# Patient Record
Sex: FEMALE | Race: WHITE
[De-identification: names, ages, dates, MRNs, and addresses within clinical notes are randomized per-mention and may not be internally consistent; named-entity substitution may affect disease eponyms.]

---

## 2021-02-01 ENCOUNTER — HOSPITAL ENCOUNTER (INPATIENT)
Dept: HOSPITAL 54 - GPS | Age: 86
LOS: 4 days | Discharge: HOME | DRG: 881 | End: 2021-02-05
Attending: INTERNAL MEDICINE | Admitting: PSYCHIATRY & NEUROLOGY
Payer: MEDICARE

## 2021-02-01 VITALS — WEIGHT: 156 LBS | BODY MASS INDEX: 25.07 KG/M2 | HEIGHT: 66 IN

## 2021-02-01 DIAGNOSIS — R45.851: ICD-10-CM
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
## 2021-02-01 DIAGNOSIS — I83.90: ICD-10-CM

## 2021-02-01 DIAGNOSIS — F32.9: Primary | ICD-10-CM

## 2021-02-01 DIAGNOSIS — Z91.5: ICD-10-CM

## 2021-02-01 DIAGNOSIS — I48.91: ICD-10-CM

## 2021-02-01 DIAGNOSIS — I10: ICD-10-CM

## 2021-02-01 DIAGNOSIS — Z79.01: ICD-10-CM

## 2021-02-01 DIAGNOSIS — F41.9: ICD-10-CM

## 2021-02-01 DIAGNOSIS — F10.10: ICD-10-CM

## 2021-02-01 PROCEDURE — G0480 DRUG TEST DEF 1-7 CLASSES: HCPCS

## 2021-02-01 NOTE — NUR
GPS RN ADMISSION NOTES:



ADMITTED AN 87YO  FEMALE ON 5150 HOLD FOR DANGER TO SELF. PATIENT CAME FROM 
[facility]. PER HOLD THE PATIENT WAS INTOXICATED, REPORTED BEING DEPRESSED 
AND IS STRUGGLING WITH DEPRESSION. PATIENT WILL BE UNDER THE CARE OF DR. HELM AND DR. YUNG RESPECTIVELY. 



UPON FACE TO FACE ASSESSMENT, PATIENT PRESENTS AS ALERT AND ORIENTED X3-4, SHE KNOWS THE 
REASON WHY SHE IS BEING ADMITTED TO THE LOCKED UNIT. SHE DOES REMEMBER CALLING 911 FOR HELP 
BUT CANNOT RECALL WHAT HAS TRANSPIRED AFTER THAT. PATIENT IS WARM PLEASANT BUT ADMITS TO 
BEING REALLY DEPRESSED AND THAT SHE HAS HAD THOUGHTS OF HURTING HERSELF. DURING ADMISSION 
PATIENT DENIES ANY PLANS OR THOUGHTS OF SUICIDE HOWEVER PATIENT IS WILLING AND ABLE TO 
CONTRACT FOR SAFETY. SHE IS AMBULATORY WITH STANDBY ASSIST, ABLE TO SIGN ADMISSION PAPERS. 
BELONGINGS AND CONTRABAND CHECKED.Q15 MIN CHECKS STARTED. CARE PLAN PATIENT SPECIFIC 
INITIATED. SKIN AND BODY ASSESSMENT DONE. NO SKIN ISSUES NOTED THIS TIME. WILL MONITOR 
PATIENT FOR MOOD, SAFETY AND BEHAVIOR.

## 2021-02-01 NOTE — NUR
Substance Abuse Intervention: ARLENE conducted a substance abuse intervention with the pt due to 
her alcohol abuse (level was at 221).

-------------------------------------------------------------------------------

Addendum: 02/05/21 at 1245 by ARLENE SORIANO

-------------------------------------------------------------------------------

ERROR: ARLENE forgot to input note earlier and accidentally placed it under the wrong date. Note 
should be dated for 2/2/21 at 3:15PM.

## 2021-02-02 VITALS — SYSTOLIC BLOOD PRESSURE: 145 MMHG | DIASTOLIC BLOOD PRESSURE: 77 MMHG

## 2021-02-02 VITALS — DIASTOLIC BLOOD PRESSURE: 88 MMHG | SYSTOLIC BLOOD PRESSURE: 148 MMHG

## 2021-02-02 VITALS — SYSTOLIC BLOOD PRESSURE: 143 MMHG | DIASTOLIC BLOOD PRESSURE: 89 MMHG

## 2021-02-02 LAB
ALBUMIN SERPL BCP-MCNC: 3.4 G/DL (ref 3.4–5)
ALP SERPL-CCNC: 70 U/L (ref 46–116)
ALT SERPL W P-5'-P-CCNC: 18 U/L (ref 12–78)
AST SERPL W P-5'-P-CCNC: 16 U/L (ref 15–37)
BILIRUB SERPL-MCNC: 0.8 MG/DL (ref 0.2–1)
BUN SERPL-MCNC: 12 MG/DL (ref 7–18)
CALCIUM SERPL-MCNC: 8.8 MG/DL (ref 8.5–10.1)
CHLORIDE SERPL-SCNC: 104 MMOL/L (ref 98–107)
CHOLEST SERPL-MCNC: 146 MG/DL (ref ?–200)
CO2 SERPL-SCNC: 28 MMOL/L (ref 21–32)
CREAT SERPL-MCNC: 1 MG/DL (ref 0.6–1.3)
GLUCOSE SERPL-MCNC: 104 MG/DL (ref 74–106)
HDLC SERPL-MCNC: 39 MG/DL (ref 40–60)
LDLC SERPL DIRECT ASSAY-MCNC: 99 MG/DL (ref 0–99)
POTASSIUM SERPL-SCNC: 3.6 MMOL/L (ref 3.5–5.1)
PROT SERPL-MCNC: 6.7 G/DL (ref 6.4–8.2)
SODIUM SERPL-SCNC: 140 MMOL/L (ref 136–145)
TRIGL SERPL-MCNC: 72 MG/DL (ref 30–150)

## 2021-02-02 RX ADMIN — TRAZODONE HYDROCHLORIDE SCH MG: 50 TABLET ORAL at 21:06

## 2021-02-02 NOTE — NUR
Initial Discharge Plan: Pt currently resides at her home alone located at [address] [place] [id number]; [phone]. Per pt, she would like to return to her home. ARLENE will 
work with the pt and the MD regarding appropriate discharge planning. SW will form a safe 
and proper discharge.

## 2021-02-02 NOTE — NUR
RN NOTE- PT ALERT INTERACTIVE ORIENTED TO PERSON PLACE PURPOSE, DEPRESSED  CALM PO INTAKE 
GOOD, CHECKED ETOH LEVEL RESULTS <3 . NO SX OF WITHDRAWAL NOTED, VS STABLE DENIES SI HI AH 
VH

## 2021-02-02 NOTE — NUR
RN NOTE- PT QUESTIONED ABOUT INFLUENZA AND PNEUMONIA VACCINE ADMINISTRATION. PT STATES "I 
NEVER RECEIVE FLU SHOTS. THEY DON'T WORK." PT HAD PNEUMONIA VACCINE IN LAST TWO YEARS. 
REFUSES BOTH AT THIS TIME.

## 2021-02-02 NOTE — NUR
Family Contact: SW called the pts grandson, Tariq ([phone]), and left a voicemail 
stating that she would like to discuss the pts discharge plan.

## 2021-02-03 VITALS — DIASTOLIC BLOOD PRESSURE: 81 MMHG | SYSTOLIC BLOOD PRESSURE: 131 MMHG

## 2021-02-03 VITALS — SYSTOLIC BLOOD PRESSURE: 120 MMHG | DIASTOLIC BLOOD PRESSURE: 68 MMHG

## 2021-02-03 VITALS — SYSTOLIC BLOOD PRESSURE: 153 MMHG | DIASTOLIC BLOOD PRESSURE: 84 MMHG

## 2021-02-03 LAB
ALBUMIN SERPL BCP-MCNC: 3.3 G/DL (ref 3.4–5)
ALP SERPL-CCNC: 68 U/L (ref 46–116)
ALT SERPL W P-5'-P-CCNC: 20 U/L (ref 12–78)
AST SERPL W P-5'-P-CCNC: 16 U/L (ref 15–37)
BASOPHILS # BLD AUTO: 0 /CMM (ref 0–0.2)
BASOPHILS NFR BLD AUTO: 0.8 % (ref 0–2)
BILIRUB SERPL-MCNC: 0.8 MG/DL (ref 0.2–1)
BILIRUB UR QL STRIP: NEGATIVE
BUN SERPL-MCNC: 11 MG/DL (ref 7–18)
CALCIUM SERPL-MCNC: 9 MG/DL (ref 8.5–10.1)
CHLORIDE SERPL-SCNC: 105 MMOL/L (ref 98–107)
CHOLEST SERPL-MCNC: 156 MG/DL (ref ?–200)
CO2 SERPL-SCNC: 28 MMOL/L (ref 21–32)
COLOR UR: YELLOW
CREAT SERPL-MCNC: 0.8 MG/DL (ref 0.6–1.3)
DEPRECATED SQUAMOUS URNS QL MICRO: (no result) /HPF
EOSINOPHIL NFR BLD AUTO: 4.2 % (ref 0–6)
GLUCOSE SERPL-MCNC: 103 MG/DL (ref 74–106)
GLUCOSE UR STRIP-MCNC: NEGATIVE MG/DL
HCT VFR BLD AUTO: 41 % (ref 33–45)
HDLC SERPL-MCNC: 37 MG/DL (ref 40–60)
HGB BLD-MCNC: 14 G/DL (ref 11.5–14.8)
LDLC SERPL DIRECT ASSAY-MCNC: 107 MG/DL (ref 0–99)
LEUKOCYTE ESTERASE UR QL STRIP: (no result)
LYMPHOCYTES NFR BLD AUTO: 2 /CMM (ref 0.8–4.8)
LYMPHOCYTES NFR BLD AUTO: 32.3 % (ref 20–44)
MAGNESIUM SERPL-MCNC: 2.1 MG/DL (ref 1.8–2.4)
MCHC RBC AUTO-ENTMCNC: 34 G/DL (ref 31–36)
MCV RBC AUTO: 95 FL (ref 82–100)
MONOCYTES NFR BLD AUTO: 0.8 /CMM (ref 0.1–1.3)
MONOCYTES NFR BLD AUTO: 12.7 % (ref 2–12)
NEUTROPHILS # BLD AUTO: 3.1 /CMM (ref 1.8–8.9)
NEUTROPHILS NFR BLD AUTO: 50 % (ref 43–81)
NITRITE UR QL STRIP: NEGATIVE
PH UR STRIP: 6 [PH] (ref 5–8)
PHOSPHATE SERPL-MCNC: 3.9 MG/DL (ref 2.5–4.9)
PLATELET # BLD AUTO: 167 /CMM (ref 150–450)
POTASSIUM SERPL-SCNC: 4 MMOL/L (ref 3.5–5.1)
PROT SERPL-MCNC: 6.7 G/DL (ref 6.4–8.2)
PROT UR QL STRIP: NEGATIVE MG/DL
RBC # BLD AUTO: 4.31 MIL/UL (ref 4–5.2)
RBC #/AREA URNS HPF: (no result) /HPF (ref 0–2)
SODIUM SERPL-SCNC: 139 MMOL/L (ref 136–145)
TRIGL SERPL-MCNC: 77 MG/DL (ref 30–150)
TSH SERPL DL<=0.005 MIU/L-ACNC: 2.33 UIU/ML (ref 0.36–3.74)
UROBILINOGEN UR STRIP-MCNC: 0.2 EU/DL
WBC #/AREA URNS HPF: (no result) /HPF
WBC #/AREA URNS HPF: (no result) /HPF (ref 0–3)
WBC NRBC COR # BLD AUTO: 6.2 K/UL (ref 4.3–11)

## 2021-02-03 RX ADMIN — APIXABAN SCH MG: 2.5 TABLET, FILM COATED ORAL at 16:25

## 2021-02-03 RX ADMIN — TRAZODONE HYDROCHLORIDE SCH MG: 50 TABLET ORAL at 21:21

## 2021-02-03 NOTE — NUR
Family Contact: SW called the pts grandson, Tariq ([phone]), and left a voicemail 
stating that she would like to discuss the pts discharge plan.

## 2021-02-04 VITALS — SYSTOLIC BLOOD PRESSURE: 155 MMHG | DIASTOLIC BLOOD PRESSURE: 88 MMHG

## 2021-02-04 VITALS — SYSTOLIC BLOOD PRESSURE: 126 MMHG | DIASTOLIC BLOOD PRESSURE: 83 MMHG

## 2021-02-04 VITALS — DIASTOLIC BLOOD PRESSURE: 92 MMHG | SYSTOLIC BLOOD PRESSURE: 142 MMHG

## 2021-02-04 RX ADMIN — LOSARTAN POTASSIUM SCH MG: 25 TABLET, FILM COATED ORAL at 08:43

## 2021-02-04 RX ADMIN — SIMVASTATIN SCH MG: 20 TABLET, FILM COATED ORAL at 08:43

## 2021-02-04 RX ADMIN — APIXABAN SCH MG: 2.5 TABLET, FILM COATED ORAL at 08:44

## 2021-02-04 RX ADMIN — APIXABAN SCH MG: 2.5 TABLET, FILM COATED ORAL at 16:26

## 2021-02-04 NOTE — NUR
Individual Intervention: SW met with the pt at bedside and inquired about the pt returning 
to her home soon. Pt stated that she wanted to go home as soon as possible. When the SW 
asked if she was going to continue to drink she stated that she drinks every day because she 
enjoys it but she can stop whenever she wants. SW stated that drinking caused her to end up 
in the hospital and the pt stated, "I will never drink as much as I did that night." SW 
asked if she can identify alternative coping mechanisms and pt stated that when she is sad 
she will call her children and attempt to speak to them instead of drinking so much.

## 2021-02-04 NOTE — NUR
GPS RN NOTE, RECEIVED PATIENT AWAKE AND IN BED, NO S/S OR COMPLAINTS OF PAIN AT THIS TIME.  
PATIENT IS DISPLAYING NO S/S OF APPARENT DISTRESS AT THIS TIME.  PATIENT BREATHING IS 
UNLABORED WITH EQUAL RISE AND FALL OF THE CHEST.  PATIENT IS ALERT AND ORIENTED X 3 ON ROOM 
AIR WITH A SPO2 99%.  PATIENT IS COMPLIANT WITH MEDICATIONS, DEPRESSED, UNMOTIVATED, GETS UP 
FOR NEEDS, AND COOPERATIVE.  PATIENT DENIES SUICIDAL AND HOMICIDAL IDEATIONS AT THIS TIME.  
PATIENT ASSISTED WITH TURNING AND REPOSITIONING Q2HR AND PRN FOR COMFORT AND CIRCULATION.  
PATIENT HAS NO NEEDS AT THIS TIME.  PATIENT EDUCATED ON THE USE OF THE CALL BELL.  PATIENT 
BED SIDE RAILS UP X 2 FOR SAFETY.  PATIENT BED IS LOCKED, LOW, WITH BED ALARM ON.  WILL 
CONTINUE TO MONITOR THIS PATIENT Q15 MINUTES WITH THE HELP OF STAFF TO MAINTAIN SAFETY.

## 2021-02-05 VITALS — DIASTOLIC BLOOD PRESSURE: 74 MMHG | SYSTOLIC BLOOD PRESSURE: 149 MMHG

## 2021-02-05 RX ADMIN — APIXABAN SCH MG: 2.5 TABLET, FILM COATED ORAL at 08:48

## 2021-02-05 RX ADMIN — SIMVASTATIN SCH MG: 20 TABLET, FILM COATED ORAL at 08:49

## 2021-02-05 RX ADMIN — LOSARTAN POTASSIUM SCH MG: 25 TABLET, FILM COATED ORAL at 08:47

## 2021-02-05 NOTE — NUR
RN OPEN NOTES

RECEIVED PATIENT AWAKE AND IN BED, NO SIGNS OF DISTRESS IN ROOM AIR SPO2 97%. NO COMPLAINTS 
OF PAIN AT THIS TIME. PATIENT BREATHING IS UNLABORED PATIENT IS ALERT AND ORIENTED X 3 ON 
ROOM AIR WITH A SPO2 99%. PATIENT IS COMPLIANT WITH MEDICATIONS, FLAT EFFECT GETS UP FOR 
NEEDS, CALM AND COOPERATIVE. COMPLAINT WITH MEDICATIONS.  PATIENT DENIES SUICIDAL AND 
HOMICIDAL IDEATIONS AT THIS TIME.SAFETY MEASURES ARE APPLIED BED SIDE RAILS UP X2 FOR 
SAFETY.  PATIENT BED IS LOCKED, LOW, WITH BED ALARM ON.  WILL CONTINUE TO MONITOR.

## 2021-02-05 NOTE — NUR
Friend Contact: ARLENE contacted the pts friend, Bridgette ([phone]), and left a voicemail 
stating that the pt is going to be discharged and that the pt will need to be picked up.

## 2021-02-05 NOTE — NUR
RN Discharge note

Patient discharge to home at this time via wheelchair accompanied by ember Clark. Patient 
vitals are within normal limit, no compliant of pain at this time and no signs of distress. 
Patient denies SI, HI, AH, and VH. Skin is intact and photo was taken of her bilateral lower 
extremity and chart in. Valuables returned and signed for. Patient ID wrist band was removed 
and patient was escorted from the unit by staff.